# Patient Record
Sex: FEMALE | Race: WHITE | NOT HISPANIC OR LATINO
[De-identification: names, ages, dates, MRNs, and addresses within clinical notes are randomized per-mention and may not be internally consistent; named-entity substitution may affect disease eponyms.]

---

## 2022-09-08 PROBLEM — Z00.00 ENCOUNTER FOR PREVENTIVE HEALTH EXAMINATION: Status: ACTIVE | Noted: 2022-09-08

## 2022-10-13 ENCOUNTER — APPOINTMENT (OUTPATIENT)
Dept: PULMONOLOGY | Facility: CLINIC | Age: 77
End: 2022-10-13
Payer: MEDICARE

## 2022-10-13 VITALS
SYSTOLIC BLOOD PRESSURE: 192 MMHG | BODY MASS INDEX: 27.48 KG/M2 | HEIGHT: 60 IN | DIASTOLIC BLOOD PRESSURE: 80 MMHG | OXYGEN SATURATION: 96 % | HEART RATE: 67 BPM | WEIGHT: 140 LBS | TEMPERATURE: 95.8 F

## 2022-10-13 DIAGNOSIS — F17.210 NICOTINE DEPENDENCE, CIGARETTES, UNCOMPLICATED: ICD-10-CM

## 2022-10-13 DIAGNOSIS — I10 ESSENTIAL (PRIMARY) HYPERTENSION: ICD-10-CM

## 2022-10-13 DIAGNOSIS — Z82.49 FAMILY HISTORY OF ISCHEMIC HEART DISEASE AND OTHER DISEASES OF THE CIRCULATORY SYSTEM: ICD-10-CM

## 2022-10-13 DIAGNOSIS — K57.90 DIVERTICULOSIS OF INTESTINE, PART UNSPECIFIED, W/OUT PERFORATION OR ABSCESS W/OUT BLEEDING: ICD-10-CM

## 2022-10-13 LAB — EPWORTH SCORE: 1

## 2022-10-13 PROCEDURE — 99407 BEHAV CHNG SMOKING > 10 MIN: CPT

## 2022-10-13 PROCEDURE — 99204 OFFICE O/P NEW MOD 45 MIN: CPT

## 2022-10-13 RX ORDER — ASPIRIN ENTERIC COATED TABLETS 81 MG 81 MG/1
81 TABLET, DELAYED RELEASE ORAL
Refills: 0 | Status: ACTIVE | COMMUNITY

## 2022-10-13 RX ORDER — AMOXICILLIN AND CLAVULANATE POTASSIUM 875; 125 MG/1; MG/1
875-125 TABLET, COATED ORAL
Refills: 0 | Status: ACTIVE | COMMUNITY

## 2022-10-13 RX ORDER — METOPROLOL SUCCINATE 25 MG/1
25 TABLET, EXTENDED RELEASE ORAL
Refills: 0 | Status: ACTIVE | COMMUNITY

## 2022-10-13 RX ORDER — HYDRALAZINE HYDROCHLORIDE 25 MG/1
25 TABLET ORAL
Refills: 0 | Status: ACTIVE | COMMUNITY

## 2022-10-13 NOTE — ASSESSMENT
[FreeTextEntry1] : above was discussed at length with the patient who has an excellent understanding of the issues. I informed the patient that this may be our last visit as I am leaving the practice at the end of December. \par

## 2022-10-13 NOTE — HISTORY OF PRESENT ILLNESS
[Current] : current [>= 20 pack years] : >= 20 pack years [Never] : never [TextBox_4] : I was asked to consult on this pt by Dr Villa for abnormal CT/ CXR. \par Patient is a 76 y/o WW, current 0-2 cig/day smoker on the patch but was smoking up to 3 PPD X 59 years w/ h/o HTN, diverticulosis, and pulmonary nodules. Patient was feeling healthy until a couple months ago when she started feeling soreness/ pressure under her L breast. The pressure occurred especially after she would eat but she did not have any pain higher up in the chest. Patient was Dx'd w/ diverticulosis via CT scan which ended up taking her a month to recover from. CT scan also discovered several nodules, including a suspicious 0.9 cm lesion in her LLL. She does c/o intermittent SOB and does not consider herself to be "much of a walker." She does not cough upon exercise but has heard herself wheeze before. She has never been on inhalers before and does not wish to start on them. She denies allergies and does not live w/ any pets. She has never had anyone complain to her that she snores at night. She used to wake up w/ night sweats many years ago but has had none post- menopause. She notes that some days she feels like she has more energy than others. She states her appetite is "too good" and she is slowly gaining weight back after losing weight from the diverticulosis. She denies any heartburn. \par \par  [TextBox_11] : 2.5 [TextBox_13] : 57 [YearQuit] : 2022

## 2022-10-13 NOTE — PHYSICAL EXAM
[No Acute Distress] : no acute distress [Normal Appearance] : normal appearance [No Neck Mass] : no neck mass [Normal Rate/Rhythm] : normal rate/rhythm [Normal S1, S2] : normal s1, s2 [No Murmurs] : no murmurs [No Resp Distress] : no resp distress [No Abnormalities] : no abnormalities [Benign] : benign [Normal Gait] : normal gait [No Clubbing] : no clubbing [No Cyanosis] : no cyanosis [No Edema] : no edema [No Focal Deficits] : no focal deficits [Oriented x3] : oriented x3 [Normal Affect] : normal affect [Nasal congestion] : nasal congestion [III] : Mallampati Class: III [No Rubs] : no rubs [No Gallops] : no gallops [TextBox_11] : dry MM, glossomegaly [TextBox_68] : + rhonchi, mild expiratory wheeze [TextBox_125] : + nicotine stains on second and third finger of R hand [TextBox_89] : obese

## 2022-10-13 NOTE — REVIEW OF SYSTEMS
[Wheezing] : wheezing [SOB on Exertion] : sob on exertion [GERD] : gerd [Arthralgias] : arthralgias [Negative] : Endocrine

## 2022-10-13 NOTE — COUNSELING
[Yes] : Risk of tobacco use and health benefits of smoking cessation discussed: Yes [Cessation strategies including cessation program discussed] : Cessation strategies including cessation program discussed [Use of nicotine replacement therapies and other medications discussed] : Use of nicotine replacement therapies and other medications discussed [FreeTextEntry3] : 12

## 2022-10-13 NOTE — DISCUSSION/SUMMARY
[FreeTextEntry1] : All images and report viewed by me in the office.\par CXR 8/25/2022: Mild interstitial prominence. Minimal atelectasis L base.\par CT 7/22/2022: Pulmonary nodules new compared to the prior study from 11/28/2013. L apex nodule 0.3 cm, subpleural nodule BERNADETTE 2 mm, 0.9 cm nodule in the LLL, and several micro nodules were noted.

## 2022-10-21 ENCOUNTER — RESULT REVIEW (OUTPATIENT)
Age: 77
End: 2022-10-21

## 2022-10-24 ENCOUNTER — APPOINTMENT (OUTPATIENT)
Dept: PULMONOLOGY | Facility: CLINIC | Age: 77
End: 2022-10-24

## 2022-10-24 DIAGNOSIS — E66.3 OVERWEIGHT: ICD-10-CM

## 2022-10-24 DIAGNOSIS — R91.1 SOLITARY PULMONARY NODULE: ICD-10-CM

## 2022-10-24 DIAGNOSIS — F17.200 NICOTINE DEPENDENCE, UNSPECIFIED, UNCOMPLICATED: ICD-10-CM

## 2022-10-24 DIAGNOSIS — R93.89 ABNORMAL FINDINGS ON DIAGNOSTIC IMAGING OF OTHER SPECIFIED BODY STRUCTURES: ICD-10-CM

## 2022-10-24 DIAGNOSIS — J44.9 CHRONIC OBSTRUCTIVE PULMONARY DISEASE, UNSPECIFIED: ICD-10-CM

## 2022-10-24 PROCEDURE — 99442: CPT | Mod: 95

## 2022-10-24 NOTE — ASSESSMENT
[FreeTextEntry1] : above was discussed at length with the patient who has an excellent understanding of the issues. 12 min telephonic

## 2022-10-24 NOTE — DISCUSSION/SUMMARY
[FreeTextEntry1] : All images and report viewed by me in the office.\par PET 10/21/2022: 4.3 cm FDG avid mass-like soft tissue density at the anal canal, SUV max 5.1, possibly representing an anal mass. Direct visualization and tissue typing is recommended. \par Non- FDG avid 8 mm nodule superior segment of the LLL indeterminate in nature.\par \par CXR 8/25/2022: Mild interstitial prominence. Minimal atelectasis L base.\par CT 7/22/2022: Pulmonary nodules new compared to the prior study from 11/28/2013. L apex nodule 0.3 cm, subpleural nodule BERNADETTE 2 mm, 0.9 cm nodule in the LLL, and several micro nodules were noted.

## 2022-10-24 NOTE — HISTORY OF PRESENT ILLNESS
[Home] : at home, [unfilled] , at the time of the visit. [Medical Office: ([facility])___] : at the medical office located in  [Verbal consent obtained from patient] : the patient, [unfilled] [Current] : current [>= 20 pack years] : >= 20 pack years [Never] : never [TextBox_4] : Patient returns on a telephonic f/u for abnormal PET CT. Results of the PET CT were discussed at length with the patient. LLL nodule was indeterminate however the PET scan demonstrated a potential anal mass. She had a colonoscopy w/ Dr. Avelino Jennings 3 weeks ago and was advised to f/u w/ him. Patient has not been smoking cigarettes in the interim.\par \par Plan: F/u w/ the gastroenterologist who did the colonoscopy 3 weeks ago. see if this could be artifactual/ what else this could be besides malignancy and if there is a need for a repeat partial rigid sig or etc to see if there is a mass of some kind. \par  [TextBox_11] : 2.5 [TextBox_13] : 57 [YearQuit] : 2022

## 2024-09-09 ENCOUNTER — RESULT REVIEW (OUTPATIENT)
Age: 79
End: 2024-09-09

## 2024-09-10 ENCOUNTER — NON-APPOINTMENT (OUTPATIENT)
Age: 79
End: 2024-09-10

## 2024-09-18 ENCOUNTER — TRANSCRIPTION ENCOUNTER (OUTPATIENT)
Age: 79
End: 2024-09-18

## 2024-09-23 ENCOUNTER — NON-APPOINTMENT (OUTPATIENT)
Age: 79
End: 2024-09-23

## 2024-09-28 ENCOUNTER — TRANSCRIPTION ENCOUNTER (OUTPATIENT)
Age: 79
End: 2024-09-28